# Patient Record
Sex: FEMALE | Race: OTHER | Employment: UNEMPLOYED | URBAN - METROPOLITAN AREA
[De-identification: names, ages, dates, MRNs, and addresses within clinical notes are randomized per-mention and may not be internally consistent; named-entity substitution may affect disease eponyms.]

---

## 2021-07-26 ENCOUNTER — OFFICE VISIT (OUTPATIENT)
Dept: PRIMARY CARE CLINIC | Age: 51
End: 2021-07-26
Payer: MEDICAID

## 2021-07-26 VITALS
BODY MASS INDEX: 27.52 KG/M2 | HEIGHT: 60 IN | HEART RATE: 83 BPM | SYSTOLIC BLOOD PRESSURE: 159 MMHG | WEIGHT: 140.2 LBS | DIASTOLIC BLOOD PRESSURE: 103 MMHG | RESPIRATION RATE: 16 BRPM | TEMPERATURE: 97.5 F | OXYGEN SATURATION: 97 %

## 2021-07-26 DIAGNOSIS — I10 ESSENTIAL HYPERTENSION: Primary | ICD-10-CM

## 2021-07-26 DIAGNOSIS — E66.3 OVERWEIGHT WITH BODY MASS INDEX (BMI) OF 27 TO 27.9 IN ADULT: ICD-10-CM

## 2021-07-26 DIAGNOSIS — Z83.3 FAMILY HISTORY OF DIABETES MELLITUS: ICD-10-CM

## 2021-07-26 PROCEDURE — 99204 OFFICE O/P NEW MOD 45 MIN: CPT | Performed by: FAMILY MEDICINE

## 2021-07-26 RX ORDER — OLMESARTAN MEDOXOMIL 40 MG/1
40 TABLET ORAL DAILY
COMMUNITY
End: 2021-07-26 | Stop reason: DRUGHIGH

## 2021-07-26 RX ORDER — CALCIUM CARBONATE 500(1250)
500 TABLET ORAL DAILY
COMMUNITY
End: 2022-10-31

## 2021-07-26 RX ORDER — OLMESARTAN MEDOXOMIL 40 MG/1
40 TABLET ORAL DAILY
Qty: 14 TABLET | Refills: 0 | Status: SHIPPED | OUTPATIENT
Start: 2021-07-26 | End: 2021-08-09

## 2021-07-26 NOTE — PROGRESS NOTES
HPI     Chief Complaint   Patient presents with   [facility] Hypertension     fatigue     She is a 54 y.o. female who presents for HTN. Has been on Benicar for about 2 years. Has been noticing high BPs at home. When she takes it at home it ranges 150/100s. Sometimes goes down to 130/80s but more often it is higher then normal.     Had [address] vaccine. Tdap at [facility].  Last PAP at [facility] with Dr. Andreina Roque about 1-2 years ago. Normal.    Review of Systems   Eyes: Negative for visual disturbance. Respiratory: Negative for shortness of breath. Cardiovascular: Negative for chest pain. Neurological: Negative for headaches. Reviewed PmHx, RxHx, FmHx, SocHx, AllgHx and updated and dated in the chart. Physical Exam:  Visit Vitals  BP (!) 159/103 (BP 1 Location: Right arm, BP Patient Position: Sitting)   Pulse 83   Temp 97.5 °F (36.4 °C) (Temporal)   Resp 16   Ht 5' (1.524 m)   Wt 140 lb 3.2 oz (63.6 kg)   SpO2 97%   BMI 27.38 kg/m²     Physical Exam  Vitals and nursing note reviewed. Constitutional:       General: She is not in acute distress. Appearance: Normal appearance. She is not ill-appearing. Cardiovascular:      Rate and Rhythm: Normal rate and regular rhythm. Heart sounds: No murmur heard. Pulmonary:      Effort: Pulmonary effort is normal. No respiratory distress. Breath sounds: Normal breath sounds. Neurological:      General: No focal deficit present. Mental Status: She is alert. Psychiatric:         Mood and Affect: Mood normal.         Behavior: Behavior normal.       No results found for this or any previous visit (from the past 12 hour(s)). Assessment / Plan     Diagnoses and all orders for this visit:    1. Essential hypertension  -     METABOLIC PANEL, COMPREHENSIVE; Future    2. Family history of diabetes mellitus  -     olmesartan (BENICAR) 40 mg tablet;  Take 1 Tablet by mouth daily for 14 days.  -     HEMOGLOBIN A1C WITH EAG; Future    3. Overweight with body mass index (BMI) of 27 to 27.9 in adult  -     HEMOGLOBIN A1C WITH EAG; Future       I have discussed the diagnosis with the patient and the intended plan as seen in the above orders. The patient has received an after-visit summary and questions were answered concerning future plans. I have discussed medication side effects and warnings with the patient as well.     Rosa Elena Barron, DO

## 2021-07-26 NOTE — PATIENT INSTRUCTIONS

## 2021-07-26 NOTE — PROGRESS NOTES
Chief Complaint   Patient presents with    Establish Care    Hypertension     fatigue       Visit Vitals  BP (!) 169/99 (BP 1 Location: Left upper arm, BP Patient Position: Sitting, BP Cuff Size: Adult)   Pulse 83   Temp 97.5 °F (36.4 °C) (Temporal)   Resp 16   Ht 5' (1.524 m)   Wt 140 lb 3.2 oz (63.6 kg)   SpO2 97%   BMI 27.38 kg/m²         1. Have you been to the ER, urgent care clinic since your last visit? Hospitalized since your last visit? No    2. Have you seen or consulted any other health care providers outside of the [address] [place] [address] since your last visit? Include any pap smears or colon screening.  Yes Where: Dr Radha Belle doctor

## 2021-07-27 DIAGNOSIS — R74.8 ELEVATED LIVER ENZYMES: Primary | ICD-10-CM

## 2021-07-27 LAB
ALBUMIN SERPL-MCNC: 4.7 G/DL (ref 3.8–4.8)
ALBUMIN/GLOB SERPL: 1.8 {RATIO} (ref 1.2–2.2)
ALP SERPL-CCNC: 73 IU/L (ref 48–121)
ALT SERPL-CCNC: 147 IU/L (ref 0–32)
AST SERPL-CCNC: 95 IU/L (ref 0–40)
BILIRUB SERPL-MCNC: 0.4 MG/DL (ref 0–1.2)
BUN SERPL-MCNC: 14 MG/DL (ref 6–24)
BUN/CREAT SERPL: 16 (ref 9–23)
CALCIUM SERPL-MCNC: 9.5 MG/DL (ref 8.7–10.2)
CHLORIDE SERPL-SCNC: 103 MMOL/L (ref 96–106)
CO2 SERPL-SCNC: 22 MMOL/L (ref 20–29)
CREAT SERPL-MCNC: 0.86 MG/DL (ref 0.57–1)
EST. AVERAGE GLUCOSE BLD GHB EST-MCNC: 111 MG/DL
GLOBULIN SER CALC-MCNC: 2.6 G/DL (ref 1.5–4.5)
GLUCOSE SERPL-MCNC: 113 MG/DL (ref 65–99)
HBA1C MFR BLD: 5.5 % (ref 4.8–5.6)
POTASSIUM SERPL-SCNC: 4.7 MMOL/L (ref 3.5–5.2)
PROT SERPL-MCNC: 7.3 G/DL (ref 6–8.5)
SODIUM SERPL-SCNC: 139 MMOL/L (ref 134–144)

## 2021-07-27 NOTE — PROGRESS NOTES
Please call patient and let her know her liver enzymes were elevated. Recommend she follow up in office to discuss further. In meantime needs to avoid Tylenol containing products and alcohol.

## 2021-07-28 LAB
HAV IGM SERPL QL IA: NEGATIVE
HBV CORE IGM SERPL QL IA: NEGATIVE
HBV SURFACE AG SERPL QL IA: NEGATIVE
HCV AB S/CO SERPL IA: <0.1 S/CO RATIO (ref 0–0.9)

## 2021-08-04 ENCOUNTER — TELEPHONE (OUTPATIENT)
Dept: PRIMARY CARE CLINIC | Age: 51
End: 2021-08-04

## 2021-08-06 ENCOUNTER — HOSPITAL ENCOUNTER (OUTPATIENT)
Dept: ULTRASOUND IMAGING | Age: 51
Discharge: HOME | End: 2021-08-06
Attending: FAMILY MEDICINE
Payer: MEDICAID

## 2021-08-06 DIAGNOSIS — R74.8 ELEVATED LIVER ENZYMES: Primary | ICD-10-CM

## 2021-08-06 DIAGNOSIS — R74.8 ELEVATED LIVER ENZYMES: ICD-10-CM

## 2021-08-06 PROCEDURE — 76705 ECHO EXAM OF ABDOMEN: CPT

## 2021-08-11 RX ORDER — OLMESARTAN MEDOXOMIL 40 MG/1
40 TABLET ORAL DAILY
Qty: 90 TABLET | Refills: 1 | Status: SHIPPED | OUTPATIENT
Start: 2021-08-11 | End: 2021-11-03 | Stop reason: SDUPTHER

## 2021-11-03 NOTE — TELEPHONE ENCOUNTER
Requested Prescriptions     Pending Prescriptions Disp Refills    olmesartan (BENICAR) 40 mg tablet 90 Tablet 1     Sig: Take 1 Tablet by mouth daily.         Last Visit 07/26/21  Last Refill 08/11/21

## 2021-11-04 RX ORDER — OLMESARTAN MEDOXOMIL 40 MG/1
40 TABLET ORAL DAILY
Qty: 30 TABLET | Refills: 0 | Status: SHIPPED | OUTPATIENT
Start: 2021-11-04 | End: 2022-01-13 | Stop reason: SDUPTHER

## 2021-12-10 ENCOUNTER — OFFICE VISIT (OUTPATIENT)
Dept: HEMATOLOGY | Age: 51
End: 2021-12-10
Payer: MEDICAID

## 2021-12-10 VITALS
SYSTOLIC BLOOD PRESSURE: 126 MMHG | WEIGHT: 135 LBS | HEART RATE: 75 BPM | TEMPERATURE: 96.6 F | RESPIRATION RATE: 16 BRPM | DIASTOLIC BLOOD PRESSURE: 80 MMHG | HEIGHT: 60 IN | BODY MASS INDEX: 26.5 KG/M2 | OXYGEN SATURATION: 98 %

## 2021-12-10 DIAGNOSIS — R74.8 ELEVATED LIVER ENZYMES: Primary | ICD-10-CM

## 2021-12-10 PROCEDURE — 99203 OFFICE O/P NEW LOW 30 MIN: CPT | Performed by: INTERNAL MEDICINE

## 2021-12-10 RX ORDER — LANOLIN ALCOHOL/MO/W.PET/CERES
500 CREAM (GRAM) TOPICAL DAILY
COMMUNITY

## 2021-12-10 NOTE — Clinical Note
12/23/2021    Patient: Elle Whitten   YOB: 1970   Date of Visit: 12/10/2021     Jose Valenzuela, [address] [facility] [address]  [address] [facility] [address]    Dear Jose Valenzuela DO,      Thank you [facility]. My notes for this consultation are attached. If you have questions, please do not hesitate to call me. I look forward to following your patient along with you.       Sincerely,    Jennifer Mckinney MD

## 2021-12-10 NOTE — PROGRESS NOTES
[address] [facility] [place] [facility]""[facility], Rose Dunham MD, MPH      SEBAS Connor, [facility]     Kiara Cowan, [facility]   MARIE Valle-C Luwanna Closs, [facility]       [facility]    at [address], [address] [place] [address], [facility] 22.    [phone]    FAX: [address] [place] [address], [address]    [phone]    FAX: [phone]       Patient Care Team:  Juancarlos Catalan DO as PCP - General (Family Medicine)  Juancarlos Catalan DO as PCP - [facility]      Problem List  Date Reviewed: 12/10/2021          Codes Class Noted    Elevated liver enzymes ICD-10-CM: R74.8  ICD-9-CM: 790.5  12/10/2021        Hypertension ICD-10-CM: I10  ICD-9-CM: 401.9  Unknown              The clinicians listed above have asked me to see Sylvester Art in consultation regarding elevated liver enzymes and its management. All medical records sent by the referring physicians were reviewed including imaging studies     The patient is a 54 y.o.  female who was found to have elevated  liver transaminases in 7/2020. Serologic evaluation for markers of chronic liver disease was negative for HCV, HBV,     Ultrasound of the liver was performed in 8/2021. The results of the imaging suggested chronic liver disease. Assessment of liver fibrosis with Fibroscan was performed in the office today. The result was 4.4 kPa which correlates with no fibrosis. The CAP score of 293 suggests hepatic steatosis. The patient does not have any symptoms which could be attributed to the liver disorder.     The patient is not experiencing the following symptoms which are commonly seen in this liver disorder: fatigue,   pain in the right side over the liver,     The patient completes all daily activities without any functional limitations. ASSESSMENT AND PLAN:  Elevated liver enzymes  Persistent elevation in liver transaminases of unclear etiology at this time. Have performed laboratory testing to monitor liver function and degree of liver injury. This included BMP, hepatic panel, CBC with platelet count, INR. Liver transaminases are elevated. ALP is normal.  Liver function is normal.  The platelet count is normal.      Based upon laboratory studies Fibroscan, and imaging the patient does not appear to have significant liver injury. Serologic testing for causes of chronic liver disease was ordered. Result was positive for a low Alpha-1-antitrypsin    The Fibroscan can be repeated annually or as often as clinically indicated to assess for fibrosis progression and/or regression. Alpha-1-antitrypsin low serum level  The patient has a low serum alpha-1-antitrypsin level. The patient may be a carrier for an A1AT genetic abnormality. Will repeat the A1AT level and phenotype. A1AT carriers do not typically get liver or lung disease. Screening for Hepatocellular Carcinoma  HCC screening is not necessary if the patient has no evidence of cirrhosis. Treatment of other medical problems in patients with chronic liver disease  There are no contraindications for the patient to take most medications that are necessary for treatment of other medical issues. Counseling for alcohol in patients with chronic liver disease  The patient was counseled regarding alcohol consumption and the effect of alcohol on chronic liver disease. The patient does not consume any significant amount of alcohol. Vaccinations   Vaccination for viral hepatitis A and B is not needed. The patient has serologic evidence of prior exposure or vaccination with immunity.     The patient has received 2 doses of COVID-19 vaccine. Routine vaccinations against other bacterial and viral agents can be performed as indicated. Annual flu vaccination should be administered if indicated. ALLERGIES  No Known Allergies    MEDICATIONS  Current Outpatient Medications   Medication Sig    cyanocobalamin (VITAMIN B12) 500 mcg tablet Take 500 mcg by mouth daily.  olmesartan (BENICAR) 40 mg tablet Take 1 Tablet by mouth daily. Needs appt before additional fills.  calcium carbonate (OS-AMBAR) 500 mg calcium (1,250 mg) tablet Take 500 Tablets by mouth daily.  amino acids (AMINO ACID PO) Take 500 mg by mouth. No current facility-administered medications for this visit. SYSTEM REVIEW NOT RELATED TO LIVER DISEASE OR REVIEWED ABOVE:  Constitution systems: Negative for fever, chills, weight gain, weight loss. Eyes: Negative for visual changes. ENT: Negative for sore throat, painful swallowing. Respiratory: Negative for cough, hemoptysis, SOB. Cardiology: Negative for chest pain, palpitations. GI:  Negative for constipation or diarrhea. : Negative for urinary frequency, dysuria, hematuria, nocturia. Skin: Negative for rash. Hematology: Negative for easy bruising, blood clots. Musculo-skelatal: Negative for back pain, muscle pain, weakness. Neurologic: Negative for headaches, dizziness, vertigo, memory problems not related to HE. Psychology: Negative for anxiety, depression. FAMILY HISTORY:  The father Has/had the following following chronic disease(s): may have a liver issues. The mother Has/had the following chronic disease(s): None. SOCIAL HISTORY:  The patient is . The patient has 2 children,   The patient has never used tobacco products. The patient consumed 2 alcohol drinks per day after death of her , then she developed covid ad lost her job. The patient has been abstinent from alcohol since 5/2021.     The patient currently works full time as a  for [employer]. PHYSICAL EXAMINATION:  Visit Vitals  /80 (BP 1 Location: Right upper arm, BP Patient Position: Sitting, BP Cuff Size: Adult)   Pulse 75   Temp (!) 96.6 °F (35.9 °C) (Temporal)   Resp 16   Ht 5' (1.524 m)   Wt 135 lb (61.2 kg)   SpO2 98%   BMI 26.37 kg/m²     General: No acute distress. Eyes: Sclera anicteric. ENT: No oral lesions. Thyroid normal.  Nodes: No adenopathy. Skin: No spider angiomata. No jaundice. No palmar erythema. Respiratory: Lungs clear to auscultation. Cardiovascular: Regular heart rate. No murmurs. No JVD. Abdomen: Soft non-tender. Liver size normal to percussion/palpation. Spleen not palpable. No obvious ascites. Extremities: No edema. No muscle wasting. No gross arthritic changes. Neurologic: Alert and oriented. Cranial nerves grossly intact. No asterixis. LABORATORY STUDIES:  Liver [place] of [address] Units 12/10/2021 7/26/2021   AST 0 - 40 IU/L 21 95 (H)   ALT 0 - 32 IU/L 41 (H) 147 (H)   Alk Phos 44 - 121 IU/L 60 73   Bili, Total 0.0 - 1.2 mg/dL <0.2 0.4   Albumin 3.8 - 4.8 g/dL 4.8 4.7   BUN 6 - 24 mg/dL 16 14   Creat 0.57 - 1.00 mg/dL 0.73 0.86   Na 134 - 144 mmol/L 142 139   K 3.5 - 5.2 mmol/L 4.2 4.7   Cl 96 - 106 mmol/L 105 103   CO2 20 - 29 mmol/L 25 22   Glucose 65 - 99 mg/dL 97 113 (H)     SEROLOGIES:  Serologies Latest Ref Rng & Units 12/10/2021 7/27/2021   Hep A Ab, Total Negative Positive (A)    Hep B Surface Ag Negative  Negative   Hep B Core Ab, Total Negative Negative    Hep B Surface AB QL  Reactive    Ferritin 15 - 150 ng/mL 372 (H)    Iron % Saturation 15 - 55 % 20    DESIREE, IFA  Negative    ASMCA 0 - 19 Units 10    Ceruloplasmin 19.0 - 39.0 mg/dL 18.7 (L)    Alpha-1 antitrypsin level 101 - 187 mg/dL 81 (L)      LIVER HISTOLOGY:  12/2021. FibroScan performed at [address]. EkPa was 4.4. IQR/med 11%. . The results suggested a fibrosis level of F0.   The CAP score suggests there is hepatic steatosis. ENDOSCOPIC PROCEDURES:  Not available or performed    RADIOLOGY:  7/2021. Ultrasound of liver. Echogenic consistent with fatty liver. No liver mass lesions. No dilated bile ducts. No ascites. OTHER TESTING:  Not available or performed    FOLLOW-UP:  All of the issues listed above in the Assessment and Plan were discussed with the patient. All questions were answered. The patient expressed a clear understanding of the above. [address] [place] [address] in 3 months for Fibroscan for routine monitoring.       Razia Ji MD  [address] 22.  [phone]  [address] [place] [address]

## 2021-12-10 NOTE — PROGRESS NOTES
Identified pt with two pt identifiers(name and ). Reviewed record in preparation for visit and have obtained necessary documentation. Chief Complaint   Patient presents with    New Patient     Establish care      Vitals:    12/10/21 1610   BP: 126/80   Pulse: 75   Resp: 16   Temp: (!) 96.6 °F (35.9 °C)   TempSrc: Temporal   SpO2: 98%   Weight: 135 lb (61.2 kg)   Height: 5' (1.524 m)   PainSc:   0 - No pain       Health Maintenance Review: Patient reminded of \"due or due soon\" health maintenance. I have asked the patient to contact his/her primary care provider (PCP) for follow-up on his/her health maintenance. Coordination of Care Questionnaire:  :   1) Have you been to an emergency room, urgent care, or hospitalized since your last visit? If yes, where when, and reason for visit? no       2. Have seen or consulted any other health care provider since your last visit? If yes, where when, and reason for visit? NO      Patient is accompanied by self I have received verbal consent from Sha Mohan to discuss any/all medical information while they are present in the room.

## 2021-12-11 LAB
A1AT SERPL-MCNC: 81 MG/DL (ref 101–187)
ALBUMIN SERPL-MCNC: 4.8 G/DL (ref 3.8–4.8)
ALBUMIN/GLOB SERPL: 1.9 {RATIO} (ref 1.2–2.2)
ALP SERPL-CCNC: 60 IU/L (ref 44–121)
ALT SERPL-CCNC: 41 IU/L (ref 0–32)
AST SERPL-CCNC: 21 IU/L (ref 0–40)
BILIRUB SERPL-MCNC: <0.2 MG/DL (ref 0–1.2)
BUN SERPL-MCNC: 16 MG/DL (ref 6–24)
BUN/CREAT SERPL: 22 (ref 9–23)
CALCIUM SERPL-MCNC: 9.7 MG/DL (ref 8.7–10.2)
CERULOPLASMIN SERPL-MCNC: 18.7 MG/DL (ref 19–39)
CHLORIDE SERPL-SCNC: 105 MMOL/L (ref 96–106)
CO2 SERPL-SCNC: 25 MMOL/L (ref 20–29)
CREAT SERPL-MCNC: 0.73 MG/DL (ref 0.57–1)
FERRITIN SERPL-MCNC: 372 NG/ML (ref 15–150)
GLOBULIN SER CALC-MCNC: 2.5 G/DL (ref 1.5–4.5)
GLUCOSE SERPL-MCNC: 97 MG/DL (ref 65–99)
HAV AB SER QL IA: POSITIVE
HBV CORE AB SERPL QL IA: NEGATIVE
HBV SURFACE AB SER QL: REACTIVE
IRON SATN MFR SERPL: 20 % (ref 15–55)
IRON SERPL-MCNC: 57 UG/DL (ref 27–159)
POTASSIUM SERPL-SCNC: 4.2 MMOL/L (ref 3.5–5.2)
PROT SERPL-MCNC: 7.3 G/DL (ref 6–8.5)
SODIUM SERPL-SCNC: 142 MMOL/L (ref 134–144)
TIBC SERPL-MCNC: 280 UG/DL (ref 250–450)
UIBC SERPL-MCNC: 223 UG/DL (ref 131–425)

## 2021-12-13 LAB — ACTIN IGG SERPL-ACNC: 10 UNITS (ref 0–19)

## 2021-12-15 LAB — ANA TITR SER IF: NEGATIVE {TITER}

## 2022-01-11 RX ORDER — OLMESARTAN MEDOXOMIL 40 MG/1
40 TABLET ORAL DAILY
Qty: 30 TABLET | Refills: 0 | OUTPATIENT
Start: 2022-01-11

## 2022-01-11 NOTE — TELEPHONE ENCOUNTER
Requested Prescriptions     Pending Prescriptions Disp Refills    olmesartan (BENICAR) 40 mg tablet 30 Tablet 0     Sig: Take 1 Tablet by mouth daily. Needs appt before additional fills.         Last Visit 07/28/21  Last Refill 11/04/21

## 2022-03-19 PROBLEM — R74.8 ELEVATED LIVER ENZYMES: Status: ACTIVE | Noted: 2021-12-10

## 2022-08-02 RX ORDER — OLMESARTAN MEDOXOMIL 40 MG/1
40 TABLET ORAL DAILY
Qty: 90 TABLET | Refills: 0 | OUTPATIENT
Start: 2022-08-02

## 2022-08-02 NOTE — TELEPHONE ENCOUNTER
PCP: Bessy Arango DO    Last appt: 7/26/2021  Future Appointments   Date Time Provider Loreto Mooney   8/16/2022 11:45 AM Bessy Arango DO [facility] AMB   10/31/2022 10:30 AM Kiara Cowan, NP [facility] BS AMB       Requested Prescriptions     Pending Prescriptions Disp Refills    olmesartan (BENICAR) 40 mg tablet 90 Tablet 0     Sig: Take 1 Tablet by mouth in the morning. Needs appt before additional fills.          Other Comments:

## 2022-08-15 ENCOUNTER — TELEPHONE (OUTPATIENT)
Dept: PRIMARY CARE CLINIC | Age: 52
End: 2022-08-15

## 2022-08-15 RX ORDER — OLMESARTAN MEDOXOMIL 40 MG/1
40 TABLET ORAL DAILY
Qty: 90 TABLET | Refills: 0 | Status: CANCELLED | OUTPATIENT
Start: 2022-08-15

## 2022-08-15 NOTE — TELEPHONE ENCOUNTER
Patient has test positive for covid has appointment tomorrow for medication refills. Wants to know if appointment can be switch to virtual. please call patient with update.

## 2022-08-16 ENCOUNTER — VIRTUAL VISIT (OUTPATIENT)
Dept: PRIMARY CARE CLINIC | Age: 52
End: 2022-08-16
Payer: MEDICAID

## 2022-08-16 DIAGNOSIS — I10 PRIMARY HYPERTENSION: Primary | ICD-10-CM

## 2022-08-16 DIAGNOSIS — U07.1 COVID-19: ICD-10-CM

## 2022-08-16 PROCEDURE — 99214 OFFICE O/P EST MOD 30 MIN: CPT | Performed by: FAMILY MEDICINE

## 2022-08-16 RX ORDER — OLMESARTAN MEDOXOMIL 40 MG/1
40 TABLET ORAL DAILY
Qty: 90 TABLET | Refills: 1 | Status: SHIPPED | OUTPATIENT
Start: 2022-08-16 | End: 2022-11-02 | Stop reason: SDUPTHER

## 2022-08-16 RX ORDER — BENZONATATE 100 MG/1
100 CAPSULE ORAL
Qty: 15 CAPSULE | Refills: 0 | Status: SHIPPED
Start: 2022-08-16 | End: 2022-11-02

## 2022-08-16 RX ORDER — ACETAMINOPHEN, DEXTROMETHORPHAN HYDROBROMIDE, GUAIFENESIN, AND PHENYLEPHRINE HYDROCHLORIDE 325; 10; 200; 5 MG/1; MG/1; MG/1; MG/1
TABLET, FILM COATED ORAL
COMMUNITY
End: 2022-11-02

## 2022-08-16 NOTE — PROGRESS NOTES
Keegan Garrido  50 y.o. female  1970  [address]  [phone]   [facility]   Telemedicine Progress Note  Ely Almaguer DO       Encounter Date and Time: August 16, 2022 at 12:21 PM    Consent: Keegan Garrido, who was seen by synchronous (real-time) audio-video technology, and/or her healthcare decision maker, is aware that this patient-initiated, Telehealth encounter on 8/16/2022 is a billable service, with coverage as determined by her insurance carrier. She is aware that she may receive a bill and has provided verbal consent to proceed: Yes. Chief Complaint   Patient presents with    Hypertension    Medication Refill    Positive For Covid-19     Since 08/11/22     History of Present Illness   Keegan Garrido is a 50 y.o. female was evaluated by synchronous (real-time) audio-video technology from home, through a secure patient portal.    COVID symptoms started 8/11/22 and tested positive 8/12/22. Endorses chills, body aches, cough, rhinorrhea. Has not been checking her temp. Checking her BP daily and typically 110-120s/ 70-80s. Review of Systems   Review of Systems   Eyes:  Negative for blurred vision. Respiratory:  Negative for shortness of breath. Cardiovascular:  Negative for chest pain. Neurological:  Negative for headaches. Vitals/Objective:     General: alert, cooperative, no distress   Mental  status: mental status: alert, oriented to person, place, and time, normal mood, behavior, speech, dress, motor activity, and thought processes   Resp: resp: normal effort and no respiratory distress   Neuro: neuro: no gross deficits   Skin: skin: no discoloration or lesions of concern on visible areas   Due to this being a TeleHealth evaluation, many elements of the physical examination are unable to be assessed.       Assessment and Plan:   Time-based coding, delete if not needed: I spent at least 10 minutes with this established patient, and >50% of the time was spent counseling and/or coordinating care regarding HTN, COVID 19    1. COVID-19  Symptomatic care discussed. Quarantine precautions given based on most current CDC recommendations. ER precautions include chest pain, shortness of breath, lethargy, confusion, decreased PO intake/ UOP. Follow up if symptoms are not improving.    - benzonatate (TESSALON) 100 mg capsule; Take 1 Capsule by mouth three (3) times daily as needed for Cough. Dispense: 15 Capsule; Refill: 0    2. Primary hypertension  BP currently uncontrolled. Discussed she needs to follow up in person for CPE before next refills are due.   - olmesartan (BENICAR) 40 mg tablet; Take 1 Tablet by mouth in the morning. Dispense: 90 Tablet; Refill: 1    Time spent in direct conversation with the patient to include medical condition(s) discussed, assessment and treatment plan: 10 min    We discussed the expected course, resolution and complications of the diagnosis(es) in detail. Medication risks, benefits, costs, interactions, and alternatives were discussed as indicated. I advised her to contact the office if her condition worsens, changes or fails to improve as anticipated. She expressed understanding with the diagnosis(es) and plan. Patient understands that this encounter was a temporary measure, and the importance of further follow up and examination was emphasized. Patient verbalized understanding. Electronically Signed: Joe Garcia DO    Aliza Adrián is a 50 y.o. female who was evaluated by an audio-video encounter for concerns as above. Patient identification was verified prior to start of the visit. A caregiver was present when appropriate. Due to this being a TeleHealth encounter (During VEWWY-93 public health emergency), evaluation of the following organ systems was limited: Vitals/Constitutional/EENT/Resp/CV/GI//MS/Neuro/Skin/Heme-Lymph-Imm.   Pursuant to the emergency declaration under the [address] and the National Emergencies Act, [facility] waiver authority and the [employer] General Act, this Virtual Visit was conducted, with patient's (and/or legal guardian's) consent, to reduce the patient's risk of exposure to COVID-19 and provide necessary medical care. Services were provided through a synchronous discussion virtually to substitute for in-person clinic visit. I was in the office. The patient was at home. History   Patients past medical, surgical and family histories were reviewed and updated. Past Medical History:   Diagnosis Date    Hypertension      Past Surgical History:   Procedure Laterality Date    HX BREAST AUGMENTATION Bilateral         HX  SECTION      HX  SECTION       No family history on file. Social History     Socioeconomic History    Marital status:      Spouse name: Not on file    Number of children: Not on file    Years of education: Not on file    Highest education level: Not on file   Occupational History    Not on file   Tobacco Use    Smoking status: Never    Smokeless tobacco: Never   Vaping Use    Vaping Use: Never used   Substance and Sexual Activity    Alcohol use:  Yes     Alcohol/week: 1.0 standard drink     Types: 1 Glasses of wine per week     Comment: occassional    Drug use: Never    Sexual activity: Not Currently   Other Topics Concern    Not on file   Social History Narrative    Not on file     Social Determinants of Health     Financial Resource Strain: Not on file   Food Insecurity: Not on file   Transportation Needs: Not on file   Physical Activity: Not on file   Stress: Not on file   Social Connections: Not on file   Intimate Partner Violence: Not on file   Housing Stability: Not on file     Patient Active Problem List   Diagnosis Code    Elevated liver enzymes R74.8    Hypertension I10          Current Medications/Allergies   Medications and Allergies reviewed:    Current Outpatient Medications   Medication Sig Dispense Refill    kjmelmrma-yv-nixubnle-guaifen (Tylenol Cold and Flu Severe) 5--200 mg tab Take  by mouth.      benzonatate (TESSALON) 100 mg capsule Take 1 Capsule by mouth three (3) times daily as needed for Cough. 15 Capsule 0    olmesartan (BENICAR) 40 mg tablet Take 1 Tablet by mouth in the morning. 90 Tablet 1    cyanocobalamin (VITAMIN B12) 500 mcg tablet Take 500 mcg by mouth daily. amino acids (AMINO ACID PO) Take 500 mg by mouth.      calcium carbonate (OS-AMBAR) 500 mg calcium (1,250 mg) tablet Take 500 Tablets by mouth daily.  (Patient not taking: Reported on 8/16/2022)       No Known Allergies

## 2022-08-16 NOTE — PROGRESS NOTES
Chief Complaint   Patient presents with    Hypertension    Medication Refill    Positive For Covid-19     Since 08/11/22     1. \"Have you been to the ER, urgent care clinic since your last visit? Hospitalized since your last visit? \" no    2. \"Have you seen or consulted any other health care providers outside of the [address] [place] [address] since your last visit? \" no

## 2022-10-30 NOTE — PROGRESS NOTES
[address] [place] [address], MD, FACP, Brianna Neeta Michaelfermin, [place]      Jane Patel, SEBAS Cowan, [facility]   Oh Ferreira, [facility]   Bhavesh Del Castillo, CHRISTINE Cilnton Poster, FNP-C Vista Oppenheim, [facility]       Santi Barriosuta Reji De Duran 136    at [address], [facility] Bravo Roberson  22.    [phone]    FAX: [address] [place] [address], [address]    [phone]    FAX: [phone]       Patient Care Team:  Susa Romberg, DO as PCP - General (Family Medicine)  Susa Romberg, DO as PCP - [facility] Provider      Problem List  Date Reviewed: 8/16/2022            Codes Class Noted    Fatty liver ICD-10-CM: K76.0  ICD-9-CM: 571.8  10/31/2022        Elevated liver enzymes ICD-10-CM: R74.8  ICD-9-CM: 790.5  12/10/2021        Hypertension ICD-10-CM: I10  ICD-9-CM: 401.9  Unknown         Karin Mejias is being seen at [facility] for management of elevated liver enzymes. The active problem list, all pertinent past medical history, medications, radiologic findings and laboratory findings related to the liver disorder were reviewed and discussed with the patient. The patient is a 50 y.o.  female who was found to have elevated  liver transaminases in 7/2020. Serologic evaluation for markers of chronic liver disease was positive for a low A1AT level. The ceruloplasmin was also low. All there testing ws negative. Ultrasound of the liver was performed in 8/2021. The results of the imaging suggested chronic liver disease or steatosis. Assessment of liver fibrosis with Fibroscan was performed 12/2021. The result was 4.4 kPa which correlates with no fibrosis. The CAP score of 293 suggests hepatic steatosis. She returns for Fibroscan today. Since the last office visit the patient notes improvement in the pain on the right side over the liver. She has been making healthy food choices and stress has improved. The patient does not have any symptoms which could be attributed to the liver disorder. The patient is not experiencing the following symptoms which are commonly seen in this liver disorder:   fatigue, or swelling of the lower extremities. The patient completes all daily activities without any functional limitations. ASSESSMENT AND PLAN:  Elevated liver enzymes with possible fatty liver  Persistent elevation in liver transaminases of unclear etiology at this time. This may be due to underlying fatty liver. Assessment of liver fibrosis with Fibroscan was performed 12/2021. The result was 4.4 kPa which correlates with no fibrosis. The CAP score of 293 suggests hepatic steatosis. Assessment of liver fibrosis was performed with Fibroscan in the office today. The result was 4.5 kPa which correlates with no fibrosis. The CAP score of 262 suggests hepatic steatosis. Have performed laboratory testing to monitor liver function and degree of liver injury. This included BMP, hepatic panel, and CBC with platelet count. Laboratory testing from 12/10/2021 reviewed in detail. Follow-up testing ordered today. The AST is normal. The ALT is slightly elevated. The ALP is normal. The liver function and platelet count are normal.     Based upon laboratory studies Fibroscan, and imaging the patient does not appear to have significant liver injury. Serologic testing for causes of chronic liver disease was ordered. Result was positive for a low Alpha-1-antitrypsin. Will order A1AT genotype. The Fibroscan can be repeated annually or as often as clinically indicated to assess for fibrosis progression and/or regression.     Alpha-1-antitrypsin low serum level  The patient has a low serum alpha-1-antitrypsin level. The patient may be a carrier for an A1AT genetic abnormality. Will repeat the A1AT level and phenotype. A1AT carriers do not typically get liver or lung disease. Screening for Hepatocellular Carcinoma  HCC screening is not necessary if the patient has no evidence of cirrhosis. Treatment of other medical problems in patients with chronic liver disease  There are no contraindications for the patient to take most medications that are necessary for treatment of other medical issues. Counseling for alcohol in patients with chronic liver disease  The patient was counseled regarding alcohol consumption and the effect of alcohol on chronic liver disease. The patient does not consume any significant amount of alcohol. Vaccinations   Vaccination for viral hepatitis A and B is not needed. The patient has serologic evidence of prior exposure or vaccination with immunity. The patient has received 2 doses of COVID-19 vaccine. Routine vaccinations against other bacterial and viral agents can be performed as indicated. Annual flu vaccination should be administered if indicated. ALLERGIES  No Known Allergies    MEDICATIONS  Current Outpatient Medications   Medication Sig    bwafzsdif-qg-rfzkeued-guaifen (Tylenol Cold and Flu Severe) 5--200 mg tab Take  by mouth.    benzonatate (TESSALON) 100 mg capsule Take 1 Capsule by mouth three (3) times daily as needed for Cough. olmesartan (BENICAR) 40 mg tablet Take 1 Tablet by mouth in the morning. cyanocobalamin (VITAMIN B12) 500 mcg tablet Take 500 mcg by mouth daily. amino acids (AMINO ACID PO) Take 500 mg by mouth. No current facility-administered medications for this visit. SYSTEM REVIEW NOT RELATED TO LIVER DISEASE OR REVIEWED ABOVE:  Constitution systems: Negative for fever, chills, weight gain, weight loss. Eyes: Negative for visual changes. ENT: Negative for sore throat, painful swallowing. Respiratory: Negative for cough, hemoptysis, SOB. Cardiology: Negative for chest pain, palpitations. GI:  Negative for constipation or diarrhea. : Negative for urinary frequency, dysuria, hematuria, nocturia. Skin: Negative for rash. Hematology: Negative for easy bruising, blood clots. Musculo-skeletal: Negative for back pain, muscle pain, weakness. Neurologic: Negative for headaches, dizziness, vertigo, memory problems not related to HE. Psychology: Negative for anxiety, depression. FAMILY HISTORY:  The father Has/had the following following chronic disease(s): may have a liver issues. The mother Has/had the following chronic disease(s): None. SOCIAL HISTORY:  The patient is . The patient has 2 children,   The patient has never used tobacco products. The patient consumed 2 alcohol drinks per day after death of her , then she developed covid ad lost her job. The patient has been abstinent from alcohol since 5/2021. The patient currently works full time as a  for [employer]. PHYSICAL EXAMINATION:  Visit Vitals  /81 (BP 1 Location: Left upper arm, BP Patient Position: Sitting, BP Cuff Size: Adult)   Pulse 85   Temp 97.2 °F (36.2 °C) (Temporal)   Ht 5' (1.524 m)   Wt 131 lb (59.4 kg)   SpO2 99%   BMI 25.58 kg/m²       General: No acute distress. Eyes: Sclera anicteric. Skin: No spider angiomata. No jaundice. No palmar erythema. Abdomen: Soft non-tender, liver size normal to percussion/palpation. Spleen not palpable. No obvious ascites. Extremities: No edema. No muscle wasting. No gross arthritic changes. Neurologic: Alert and oriented. Cranial nerves grossly intact. No asterixis.     LABORATORY STUDIES:  Liver [place] of [address] [place] [address] & Units 12/10/2021 7/26/2021   AST 0 - 40 IU/L 21 95 (H)   ALT 0 - 32 IU/L 41 (H) 147 (H)   Alk Phos 44 - 121 IU/L 60 73   Bili, Total 0.0 - 1.2 mg/dL <0.2 0.4   Albumin 3.8 - 4.8 g/dL 4.8 4.7 BUN 6 - 24 mg/dL 16 14   Creat 0.57 - 1.00 mg/dL 0.73 0.86   Na 134 - 144 mmol/L 142 139   K 3.5 - 5.2 mmol/L 4.2 4.7   Cl 96 - 106 mmol/L 105 103   CO2 20 - 29 mmol/L 25 22   Glucose 65 - 99 mg/dL 97 113 (H)     Laboratory testing from 12/10/2021 reviewed in detail. Additional testing included to evaluate progression or regression of disease. Laboratory testing results from today will be communicated by My Chart. SEROLOGIES:  Serologies Latest Ref Rng & Units 12/10/2021 7/27/2021   Hep A Ab, Total Negative Positive (A)    Hep B Surface Ag Negative  Negative   Hep B Core Ab, Total Negative Negative    Hep B Surface AB QL  Reactive    Ferritin 15 - 150 ng/mL 372 (H)    Iron % Saturation 15 - 55 % 20    DESIREE, IFA  Negative    ASMCA 0 - 19 Units 10    Ceruloplasmin 19.0 - 39.0 mg/dL 18.7 (L)    Alpha-1 antitrypsin level 101 - 187 mg/dL 81 (L)      LIVER HISTOLOGY:  12/2021. FibroScan performed at [facility]. EkPa was 4.4. IQR/med 11%. . The results suggested a fibrosis level of F0. The CAP score suggests there is hepatic steatosis. 10/2022. FibroScan performed at [facility]. EkPa was 4.5. IQR/med 19%. . The results suggested a fibrosis level of F0. The CAP score suggests there is hepatic steatosis. ENDOSCOPIC PROCEDURES:  Not available or performed    RADIOLOGY:  7/2021. Ultrasound of liver. Echogenic consistent with fatty liver. No liver mass lesions. No dilated bile ducts. No ascites. OTHER TESTING:  Not available or performed    FOLLOW-UP:  All of the issues listed above in the Assessment and Plan were discussed with the patient. All questions were answered. The patient expressed a clear understanding of the above. [address] [facility] 87 in 6 months for ongoing monitoring and treatment. GEOVANNI [facility] of [address] [id number] Hakeem Ríos [address] [place] [address] [place]

## 2022-10-31 ENCOUNTER — OFFICE VISIT (OUTPATIENT)
Dept: HEMATOLOGY | Age: 52
End: 2022-10-31
Payer: MEDICAID

## 2022-10-31 VITALS
HEIGHT: 60 IN | TEMPERATURE: 97.2 F | WEIGHT: 131 LBS | BODY MASS INDEX: 25.72 KG/M2 | HEART RATE: 85 BPM | OXYGEN SATURATION: 99 % | SYSTOLIC BLOOD PRESSURE: 120 MMHG | DIASTOLIC BLOOD PRESSURE: 81 MMHG

## 2022-10-31 DIAGNOSIS — K76.0 FATTY LIVER: ICD-10-CM

## 2022-10-31 DIAGNOSIS — E88.01 LOW PLASMA ALPHA-1 ANTITRYPSIN (HCC): ICD-10-CM

## 2022-10-31 DIAGNOSIS — R74.8 ELEVATED LIVER ENZYMES: Primary | ICD-10-CM

## 2022-10-31 PROCEDURE — 3079F DIAST BP 80-89 MM HG: CPT | Performed by: NURSE PRACTITIONER

## 2022-10-31 PROCEDURE — 99214 OFFICE O/P EST MOD 30 MIN: CPT | Performed by: NURSE PRACTITIONER

## 2022-10-31 PROCEDURE — 3074F SYST BP LT 130 MM HG: CPT | Performed by: NURSE PRACTITIONER

## 2022-10-31 PROCEDURE — 91200 LIVER ELASTOGRAPHY: CPT | Performed by: NURSE PRACTITIONER

## 2022-10-31 NOTE — PROGRESS NOTES
Identified pt with two pt identifiers(name and ). Reviewed record in preparation for visit and have obtained necessary documentation. Chief Complaint   Patient presents with    Follow-up     FIBROSCAN      Vitals:    10/31/22 1031   BP: 120/81   Pulse: 85   Temp: 97.2 °F (36.2 °C)   TempSrc: Temporal   SpO2: 99%   Weight: 131 lb (59.4 kg)   Height: 5' (1.524 m)   PainSc:   2   PainLoc: Back       Health Maintenance Review: Patient reminded of \"due or due soon\" health maintenance. I have asked the patient to contact his/her primary care provider (PCP) for follow-up on his/her health maintenance. Coordination of Care Questionnaire:  :   1) Have you been to an emergency room, urgent care, or hospitalized since your last visit? If yes, where when, and reason for visit? no       2. Have seen or consulted any other health care provider since your last visit? If yes, where when, and reason for visit? NO      Patient is accompanied by self I have received verbal consent from Cynthia Epperson to discuss any/all medical information while they are present in the room.

## 2022-11-02 ENCOUNTER — OFFICE VISIT (OUTPATIENT)
Dept: PRIMARY CARE CLINIC | Age: 52
End: 2022-11-02
Payer: MEDICAID

## 2022-11-02 ENCOUNTER — HOSPITAL ENCOUNTER (OUTPATIENT)
Dept: GENERAL RADIOLOGY | Age: 52
Discharge: HOME | End: 2022-11-02
Attending: FAMILY MEDICINE
Payer: MEDICAID

## 2022-11-02 VITALS
TEMPERATURE: 97.8 F | WEIGHT: 131 LBS | BODY MASS INDEX: 25.72 KG/M2 | RESPIRATION RATE: 18 BRPM | HEIGHT: 60 IN | OXYGEN SATURATION: 98 %

## 2022-11-02 DIAGNOSIS — M54.2 NECK PAIN: ICD-10-CM

## 2022-11-02 DIAGNOSIS — Z12.11 SCREENING FOR COLON CANCER: ICD-10-CM

## 2022-11-02 DIAGNOSIS — R42 DIZZINESS: ICD-10-CM

## 2022-11-02 DIAGNOSIS — R20.0 LEFT ARM NUMBNESS: ICD-10-CM

## 2022-11-02 DIAGNOSIS — R22.32 MASS OF LEFT HAND: ICD-10-CM

## 2022-11-02 DIAGNOSIS — Z23 ENCOUNTER FOR IMMUNIZATION: ICD-10-CM

## 2022-11-02 DIAGNOSIS — Z12.31 ENCOUNTER FOR SCREENING MAMMOGRAM FOR MALIGNANT NEOPLASM OF BREAST: ICD-10-CM

## 2022-11-02 DIAGNOSIS — I10 PRIMARY HYPERTENSION: Primary | ICD-10-CM

## 2022-11-02 PROCEDURE — 99214 OFFICE O/P EST MOD 30 MIN: CPT | Performed by: FAMILY MEDICINE

## 2022-11-02 PROCEDURE — 72040 X-RAY EXAM NECK SPINE 2-3 VW: CPT

## 2022-11-02 PROCEDURE — 90686 IIV4 VACC NO PRSV 0.5 ML IM: CPT | Performed by: FAMILY MEDICINE

## 2022-11-02 RX ORDER — METHOCARBAMOL 500 MG/1
500 TABLET, FILM COATED ORAL
Qty: 7 TABLET | Refills: 0 | Status: SHIPPED | OUTPATIENT
Start: 2022-11-02

## 2022-11-02 RX ORDER — OLMESARTAN MEDOXOMIL 40 MG/1
40 TABLET ORAL DAILY
Qty: 90 TABLET | Refills: 1 | Status: SHIPPED | OUTPATIENT
Start: 2022-11-02

## 2022-11-02 NOTE — PROGRESS NOTES
HPI     Chief Complaint   Patient presents with    Other     Patient stated she gets dizzy when coming to an up position from bending down     Tingling leg arm x 6 months     Bump on top of left hand x 6-7 months,     Need referral for mammogram   Need referral for colonoscopy      She is a 50 y.o. female who presents for multiple concerns. States she has tingling on arms for about 6-7 months. Starts where she has a knot in her back and then goes down the arm. Only on L side. She is also having some neck pain in the back of her neck. She does not have pain down the arm. She is R handed. No injury/ trauma. Overall getting worse. She has no issues with  strength or dropping things. States she also has dizziness when she stands up and is not sure if this is related. Ongoing about 6 months but increasing in frequency. BP has not been too low when she checks. States bump started out tiny years ago and then went away. States it came back about 6-7 months ago and got bigger. States it does not hurt to touch but when she moves her wrist or holds things it hurts the wrist.     Needs referral for colonoscopy. No fam hx . Has not had one before. Needs referral for mammogram.     Review of Systems   Musculoskeletal:  Positive for neck pain. Neurological:  Positive for numbness. Negative for weakness. Reviewed PmHx, RxHx, FmHx, SocHx, AllgHx and updated and dated in the chart. Physical Exam:  Visit Vitals  Temp 97.8 °F (36.6 °C) (Temporal)   Resp 18   Ht 5' (1.524 m)   Wt 131 lb (59.4 kg)   LMP 11/02/2019   SpO2 98%   BMI 25.58 kg/m²     Physical Exam  Vitals and nursing note reviewed. Constitutional:       General: She is not in acute distress. Appearance: Normal appearance. She is not ill-appearing. Cardiovascular:      Rate and Rhythm: Normal rate and regular rhythm. Heart sounds: No murmur heard. Pulmonary:      Effort: Pulmonary effort is normal. No respiratory distress. Breath sounds: Normal breath sounds. Musculoskeletal:      Comments: Dowshelbi hump noted. She has spasm of upper L trap. Strength 5/5 in upper ext BL and gross sensation intact BL in upper ext. Mobile dime size non tender soft tissue mass on dorsal aspect of L hand. Neurological:      General: No focal deficit present. Mental Status: She is alert. Mental status is at baseline. Cranial Nerves: No cranial nerve deficit. Sensory: No sensory deficit. Motor: No weakness. Coordination: Coordination normal.      Gait: Gait normal.   Psychiatric:         Mood and Affect: Mood normal.         Behavior: Behavior normal.     No results found for this or any previous visit (from the past 12 hour(s)). Assessment / Plan     Diagnoses and all orders for this visit:    1. Primary hypertension  -     olmesartan (BENICAR) 40 mg tablet; Take 1 Tablet by mouth daily. 2. Encounter for immunization  -     INFLUENZA, FLUARIX, FLULAVAL, FLUZONE (AGE 6 MO+), AFLURIA(AGE 3Y+) IM, PF, 0.5 ML  -     [place] IMMUNIZ ADMIN,1 SINGLE/COMB VAC/TOXOID    3. Encounter for screening mammogram for malignant neoplasm of breast  -     [facility] MAMMO BI SCREENING INCL CAD; Future    4. Neck pain  -     XR SPINE CERV PA LAT ODONT 3 V MAX; Future  -     methocarbamoL (ROBAXIN) 500 mg tablet; Take 1 Tablet by mouth nightly as needed for Muscle Spasm(s). 5. Left arm numbness  -     XR SPINE CERV PA LAT ODONT 3 V MAX; Future    6. Dizziness  -     TSH 3RD GENERATION; Future    7. Screening for colon cancer  -     REFERRAL TO GASTROENTEROLOGY    8. Mass of left hand  -     REFERRAL TO ORTHOPEDICS     Recent labs show normal Hg, Cr, AST/ ALT and electrolytes. Orthostatics negative. Advised to hydrate well. Check TSH. If symptoms persist need to follow up with Neuro. I have discussed the diagnosis with the patient and the intended plan as seen in the above orders.  The patient has received an after-visit summary and questions were answered concerning future plans. I have discussed medication side effects and warnings with the patient as well.     Param Benavides, DO

## 2022-11-02 NOTE — PROGRESS NOTES
Chief Complaint   Patient presents with    Other     Patient stated she gets dizzy when coming to an up position from bending down     Tingling leg arm x 6 months     Bump on top of left hand x 6-7 months,     Need referral for mammogram   Need referral for colonoscopy         Visit Vitals  /81 (BP 1 Location: Left upper arm, BP Patient Position: Sitting)   Pulse 69   Temp 97.8 °F (36.6 °C) (Temporal)   Resp 18   Ht 5' (1.524 m)   Wt 131 lb (59.4 kg)   LMP 11/02/2019   SpO2 98%   BMI 25.58 kg/m²        Health Maintenance Due   Topic    DTaP/Tdap/Td series (1 - Tdap)    Cervical cancer screen     Lipid Screen     Colorectal Cancer Screening Combo     COVID-19 Vaccine (3 - Booster for Pfizer series)    Shingrix Vaccine Age 50> (1 of 2)    Breast Cancer Screen Mammogram         1. \"Have you been to the ER, urgent care clinic since your last visit? Hospitalized since your last visit? \" No    2. \"Have you seen or consulted any other health care providers outside of the [address] [place] [address] since your last visit? \" No     3. For patients aged 39-70: Has the patient had a colonoscopy / FIT/ Cologuard? Yes - Care Gap present. Rooming MA/LPN to request most recent results      If the patient is female:    4. For patients aged 41-77: Has the patient had a mammogram within the past 2 years? Yes - Care Gap present. Rooming MA/LPN to request most recent results      5. For patients aged 21-65: Has the patient had a pap smear? Yes - Care Gap present.  Rooming MA/LPN to request most recent results

## 2022-11-08 DIAGNOSIS — M54.2 NECK PAIN: Primary | ICD-10-CM

## 2022-11-08 LAB
ALBUMIN SERPL-MCNC: 5 G/DL (ref 3.8–4.8)
ALP SERPL-CCNC: 70 IU/L (ref 44–121)
ALT SERPL-CCNC: 28 IU/L (ref 0–32)
AST SERPL-CCNC: 24 IU/L (ref 0–40)
BASOPHILS # BLD AUTO: 0.1 X10E3/UL (ref 0–0.2)
BASOPHILS NFR BLD AUTO: 1 %
BILIRUB DIRECT SERPL-MCNC: <0.1 MG/DL (ref 0–0.4)
BILIRUB SERPL-MCNC: 0.3 MG/DL (ref 0–1.2)
BUN SERPL-MCNC: 19 MG/DL (ref 6–24)
BUN/CREAT SERPL: 21 (ref 9–23)
CALCIUM SERPL-MCNC: 10.2 MG/DL (ref 8.7–10.2)
CHLORIDE SERPL-SCNC: 102 MMOL/L (ref 96–106)
CO2 SERPL-SCNC: 25 MMOL/L (ref 20–29)
CREAT SERPL-MCNC: 0.91 MG/DL (ref 0.57–1)
EGFR: 77 ML/MIN/1.73
EOSINOPHIL # BLD AUTO: 0.3 X10E3/UL (ref 0–0.4)
EOSINOPHIL NFR BLD AUTO: 4 %
ERYTHROCYTE [DISTWIDTH] IN BLOOD BY AUTOMATED COUNT: 15.3 % (ref 11.7–15.4)
GLUCOSE SERPL-MCNC: 80 MG/DL (ref 70–99)
HCT VFR BLD AUTO: 41.8 % (ref 34–46.6)
HGB BLD-MCNC: 12.8 G/DL (ref 11.1–15.9)
IMM GRANULOCYTES # BLD AUTO: 0 X10E3/UL (ref 0–0.1)
IMM GRANULOCYTES NFR BLD AUTO: 0 %
LYMPHOCYTES # BLD AUTO: 2.7 X10E3/UL (ref 0.7–3.1)
LYMPHOCYTES NFR BLD AUTO: 42 %
MCH RBC QN AUTO: 21.1 PG (ref 26.6–33)
MCHC RBC AUTO-ENTMCNC: 30.6 G/DL (ref 31.5–35.7)
MCV RBC AUTO: 69 FL (ref 79–97)
MONOCYTES # BLD AUTO: 0.6 X10E3/UL (ref 0.1–0.9)
MONOCYTES NFR BLD AUTO: 9 %
NEUTROPHILS # BLD AUTO: 2.8 X10E3/UL (ref 1.4–7)
NEUTROPHILS NFR BLD AUTO: 44 %
PLATELET # BLD AUTO: 361 X10E3/UL (ref 150–450)
POTASSIUM SERPL-SCNC: 4.6 MMOL/L (ref 3.5–5.2)
PROT SERPL-MCNC: 7.6 G/DL (ref 6–8.5)
RBC # BLD AUTO: 6.07 X10E6/UL (ref 3.77–5.28)
SODIUM SERPL-SCNC: 140 MMOL/L (ref 134–144)
WBC # BLD AUTO: 6.4 X10E3/UL (ref 3.4–10.8)

## 2022-11-08 NOTE — PROGRESS NOTES
Letter sent via my chart the liver enzymes are now normal. The liver function is also normal. The renal function and CBC are normal.

## 2022-11-09 LAB — TSH SERPL DL<=0.005 MIU/L-ACNC: 2.3 UIU/ML (ref 0.45–4.5)

## 2022-11-22 ENCOUNTER — OFFICE VISIT (OUTPATIENT)
Dept: ORTHOPEDIC SURGERY | Age: 52
End: 2022-11-22
Payer: MEDICAID

## 2022-11-22 VITALS — WEIGHT: 130 LBS | BODY MASS INDEX: 25.52 KG/M2 | HEIGHT: 60 IN

## 2022-11-22 DIAGNOSIS — M67.432 GANGLION CYST OF DORSUM OF LEFT WRIST: ICD-10-CM

## 2022-11-22 DIAGNOSIS — M79.642 LEFT HAND PAIN: Primary | ICD-10-CM

## 2022-11-22 DIAGNOSIS — M77.8 LEFT WRIST TENDINITIS: ICD-10-CM

## 2022-11-22 PROCEDURE — 99203 OFFICE O/P NEW LOW 30 MIN: CPT | Performed by: ORTHOPAEDIC SURGERY

## 2022-11-22 RX ORDER — METHYLPREDNISOLONE 4 MG/1
TABLET ORAL
Qty: 1 DOSE PACK | Refills: 0 | Status: SHIPPED | OUTPATIENT
Start: 2022-11-22

## 2022-11-22 NOTE — PATIENT INSTRUCTIONS
Ganglions: Care Instructions  Your Care Instructions     A ganglion is a small sac, or cyst, filled with a clear fluid that is like jelly. A ganglion may look like a bump on the hand or wrist. It also can appear on your feet, ankles, knees, or shoulders. It is not cancer. A ganglion can grow out of the protective area, or capsule, around a joint. It also can grow on a tendon sheath, which covers the ropelike tendons that connect muscle to bone. A ganglion may hurt or cause numbness if it presses on a nerve. Many ganglions do not need treatment, and they often go away on their own. But if a ganglion hurts, becomes larger, causes numbness, or limits your activity, your doctor may want to drain it with a needle and syringe or remove it with minor surgery. Follow-up care is a key part of your treatment and safety. Be sure to make and go to all appointments, and call your doctor if you are having problems. It's also a good idea to know your test results and keep a list of the medicines you take. How can you care for yourself at home? Wear a wrist or finger splint as directed by your doctor. It will keep your wrist or hand from moving and help reduce the fluid in the cyst. This may be all you need for the ganglion to shrink and go away. Do not smash a ganglion with a book or other heavy object. You may break a bone or otherwise injure your wrist by trying this folk remedy, and the ganglion may return anyway. Do not try to drain the fluid by poking the ganglion with a pin or any other sharp object. You could cause an infection. When should you call for help? Call your doctor now or seek immediate medical care if:    You have signs of infection, such as: Increased pain, swelling, warmth, or redness. Red streaks leading from the cyst.  Pus draining from the cyst.  A fever. Watch closely for changes in your health, and be sure to contact your doctor if:    You have increasing pain.      Your ganglion is getting larger. You still have pain or numbness from a ganglion. Where can you learn more? Go to http://www.gray.com/  Enter [id number] in the search box to learn more about \"Ganglions: Care Instructions. \"  Current as of: March 9, 2022               Content Version: 13.4  © 0759-2378 [employer]. Care instructions adapted under license by [employer] (which disclaims liability or warranty for this information). If you have questions about a medical condition or this instruction, always ask your healthcare professional. [place] [address] any warranty or liability for your use of this information.

## 2022-11-22 NOTE — LETTER
11/22/2022    Patient: Maryanne Perez   YOB: 1970   Date of Visit: 11/22/2022     Ronny Thompson, [address] [facility] [address]  [place]    Dear Ronny Thompson DO,      Thank you for referring Ms. Maryanne Perez to [facility] for evaluation. My notes for this consultation are attached. If you have questions, please do not hesitate to call me. I look forward to following your patient along with you.       Sincerely,    Damion Cai MD

## 2022-11-22 NOTE — PROGRESS NOTES
HPI: Sulma Farah (: 1970) is a 50 y.o. female, patient, here for evaluation of the following chief complaint(s):    Wrist Pain (Left wrist dorsal bump. Initially occurred years ago and was small but this resolved. 6 months ago, it appeared again and has gradually gotten larger. Denies injury to area. Right hand dominant .)  Patient is seen today to evaluate her left wrist.  The patient has developed a left dorsal radial wrist ganglion cyst worsening since the beginning of the summer. She denies any fall or injury. She states that it was actually present a long time ago and was tiny and then seem to resolve but she noticed that at the late spring or early summer junction. It is slowly enlarged and states that it is sore to touch and bothers her in extension. When she flexes her wrist she localizes pain dorsally. She denies any numbness tingling or paresthesias in her hand or any digital locking. She has complained of some left-sided potential radicular pain and is scheduled to see a physician for her neck in December. Vitals:  Ht 5' (1.524 m)   Wt 130 lb (59 kg)   BMI 25.39 kg/m²    Body mass index is 25.39 kg/m². Allergies   Allergen Reactions    Latex Rash    Amoxicillin Nausea and Vomiting and Vertigo       Current Outpatient Medications   Medication Sig    calcium carbonate (CALCIUM 300 PO) Take  by mouth.    methylPREDNISolone (MEDROL DOSEPACK) 4 mg tablet Per dose pack instructions    olmesartan (BENICAR) 40 mg tablet Take 1 Tablet by mouth daily. cyanocobalamin (VITAMIN B12) 500 mcg tablet Take 500 mcg by mouth daily. amino acids (AMINO ACID PO) Take 500 mg by mouth. methocarbamoL (ROBAXIN) 500 mg tablet Take 1 Tablet by mouth nightly as needed for Muscle Spasm(s). No current facility-administered medications for this visit.        Past Medical History:   Diagnosis Date    Hypertension         Past Surgical History:   Procedure Laterality Date    HX BREAST AUGMENTATION Bilateral         HX  SECTION      HX  SECTION         History reviewed. No pertinent family history. Social History     Tobacco Use    Smoking status: Never    Smokeless tobacco: Never   Vaping Use    Vaping Use: Never used   Substance Use Topics    Alcohol use: Yes     Alcohol/week: 1.0 standard drink     Types: 1 Glasses of wine per week     Comment: occassional    Drug use: Never        Review of Systems   All other systems reviewed and are negative. Physical Exam    Overall the patient demonstrates good elbow, forearm, wrist and hand motion. She has a 1 to 1.5 cm fairly firm but mobile dorsal radial left wrist ganglion cyst.  This is noninfectious in appearance. She has discomfort when she hyperextends her wrist and applies a load such as pushing off. She denies numbness or tingling and had negative Tinel's Phalen's and nerve compression test and no digital locking. Imaging:    XR Results (most recent):  Results from Appointment encounter on 22    XR WRIST LT AP/LAT/OBL MIN 3V    Narrative  AP, lateral and oblique x-ray of the left wrist shows normal osseous and joint space findings no evidence of fracture, arthritis, calcification or acute abnormality. There is ulnar neutral to some very slight ulnar positive variance. ASSESSMENT/PLAN:  Below is the assessment and plan developed based on review of pertinent history, physical exam, labs, studies, and medications. Patient examination was consistent with left wrist pain tendinitis and mostly a dorsal radial wrist ganglion cyst.  This has been present since this spring summer junction of  although she feels it may have been present years ago but was much smaller. She was offered but deferred an aspiration and prefers instead to proceed with operative excision of the ganglion cyst.  She may still trial a Medrol Dosepak to help with some of the tendinitis and localized swelling.   I reviewed the procedure in detail and answered questions. I reviewed risks that include but not limited to stiffness, pain, nerve or tendon damage and cyst recurrence. Arrangements can be made for this to be performed on an outpatient basis soon as possible. 1. Left hand pain  -     XR WRIST LT AP/LAT/OBL MIN 3V; Future  -     methylPREDNISolone (MEDROL DOSEPACK) 4 mg tablet; Per dose pack instructions, Normal, Disp-1 Dose Pack, R-0  2. Ganglion cyst of dorsum of left wrist  3. Left wrist tendinitis      Return for Follow-up 7 to 10 days after surgery. .    An electronic signature was used to authenticate this note.   -- Danay Gillette MD

## 2022-11-23 ENCOUNTER — OFFICE VISIT (OUTPATIENT)
Dept: PRIMARY CARE CLINIC | Age: 52
End: 2022-11-23
Payer: MEDICAID

## 2022-11-23 VITALS
SYSTOLIC BLOOD PRESSURE: 110 MMHG | OXYGEN SATURATION: 97 % | BODY MASS INDEX: 25.52 KG/M2 | HEART RATE: 70 BPM | WEIGHT: 130 LBS | RESPIRATION RATE: 18 BRPM | HEIGHT: 60 IN | DIASTOLIC BLOOD PRESSURE: 72 MMHG | TEMPERATURE: 98.4 F

## 2022-11-23 DIAGNOSIS — I10 PRIMARY HYPERTENSION: Primary | ICD-10-CM

## 2022-11-23 DIAGNOSIS — Z11.52 ENCOUNTER FOR SCREENING FOR COVID-19: ICD-10-CM

## 2022-11-23 DIAGNOSIS — Z12.4 SCREENING FOR CERVICAL CANCER: ICD-10-CM

## 2022-11-23 LAB — SARS-COV-2 POC: NEGATIVE

## 2022-11-23 PROCEDURE — 87426 SARSCOV CORONAVIRUS AG IA: CPT | Performed by: FAMILY MEDICINE

## 2022-11-23 PROCEDURE — 3078F DIAST BP <80 MM HG: CPT | Performed by: FAMILY MEDICINE

## 2022-11-23 PROCEDURE — 3074F SYST BP LT 130 MM HG: CPT | Performed by: FAMILY MEDICINE

## 2022-11-23 PROCEDURE — 99212 OFFICE O/P EST SF 10 MIN: CPT | Performed by: FAMILY MEDICINE

## 2022-11-23 NOTE — PROGRESS NOTES
Chief Complaint   Patient presents with    Gyn Exam     Identified pt with two pt identifiers(name and ). Chief Complaint   Patient presents with    Gyn Exam        3 most recent PHQ Screens 2022   Little interest or pleasure in doing things Not at all   Feeling down, depressed, irritable, or hopeless Not at all   Total Score PHQ 2 0        Vitals:    22 1155 22 1158   BP: 109/65 101/67   Pulse: 70    Resp: 18    Temp: 98.4 °F (36.9 °C)    TempSrc: Temporal    SpO2: 97%    Weight: 130 lb (59 kg)    Height: 5' (1.524 m)    PainSc:   2   2   PainLoc: Hand Hand       Health Maintenance Due   Topic Date Due    DTaP/Tdap/Td series (1 - Tdap) Never done    Cervical cancer screen  Never done    Lipid Screen  Never done    Colorectal Cancer Screening Combo  Never done    COVID-19 Vaccine (3 - Booster for Pfizer series) 2021    Shingrix Vaccine Age 50> (1 of 2) Never done    Breast Cancer Screen Mammogram  Never done        1. Have you been to the ER, urgent care clinic since your last visit? Hospitalized since your last visit? No    2. Have you seen or consulted any other health care providers outside of the [address] [place] [address] since your last visit? Include any pap smears or colon screening. No    3. For patients aged 39-70: Has the patient had a colonoscopy / FIT/ Cologuard? No      If the patient is female:    4. For patients aged 41-77: Has the patient had a mammogram within the past 2 years? No      5. For patients aged 21-65: Has the patient had a pap smear? No    Chief Complaint   Patient presents with    Gyn Exam    Hypotension     Patient was concerned that her BP was lower than normal when taken today in the office.

## 2022-11-23 NOTE — PROGRESS NOTES
HPI     Chief Complaint   Patient presents with    Gyn Exam    Hypotension     Patient was concerned that her BP was lower than normal when taken today in the office. She is a 50 y.o. female who presents for GYN exam.     Does not want STD testing with PAP. States BP was a little lower today then normal. Does not feel dizzy. Review of Systems   Genitourinary:  Negative for vaginal bleeding and vaginal discharge. Neurological:  Negative for dizziness and light-headedness. Reviewed PmHx, RxHx, FmHx, SocHx, AllgHx and updated and dated in the chart. Physical Exam:  Visit Vitals  /72   Pulse 70   Temp 98.4 °F (36.9 °C) (Temporal)   Resp 18   Ht 5' (1.524 m)   Wt 130 lb (59 kg)   SpO2 97%   BMI 25.39 kg/m²     Physical Exam  Vitals and nursing note reviewed. Constitutional:       General: She is not in acute distress. Appearance: Normal appearance. She is not ill-appearing. Cardiovascular:      Rate and Rhythm: Normal rate and regular rhythm. Heart sounds: No murmur heard. Pulmonary:      Effort: Pulmonary effort is normal. No respiratory distress. Breath sounds: Normal breath sounds. Genitourinary:     Comments: Pelvic - Exam chaperoned by yCnthia Wild MA. External genitalia normal without rashes or lesions. Pink and moist vaginal mucosa. Scant white discharge. Cervix without lesions or abnormal discharge. Uterus non tender and normal size. No adnexal masses. Neurological:      General: No focal deficit present. Mental Status: She is alert. Psychiatric:         Mood and Affect: Mood normal.         Behavior: Behavior normal.          Assessment / Plan     Diagnoses and all orders for this visit:    1. Primary hypertension - BP okay on recheck. Continue to monitor at home. 2. Screening for cervical cancer  -     PAP IG, APTIMA HPV AND RFX 16/18,45 ([id number])     I have discussed the diagnosis with the patient and the intended plan as seen in the above orders.  The patient has received an after-visit summary and questions were answered concerning future plans. I have discussed medication side effects and warnings with the patient as well.     Lawence Rein, DO

## 2022-12-01 DIAGNOSIS — M67.432 GANGLION CYST OF DORSUM OF LEFT WRIST: Primary | ICD-10-CM

## 2022-12-13 DIAGNOSIS — M67.432 GANGLION CYST OF DORSUM OF LEFT WRIST: Primary | ICD-10-CM

## 2022-12-14 RX ORDER — HYDROCODONE BITARTRATE AND ACETAMINOPHEN 5; 325 MG/1; MG/1
1 TABLET ORAL
Qty: 15 TABLET | Refills: 0 | Status: SHIPPED | OUTPATIENT
Start: 2022-12-14 | End: 2022-12-19

## 2022-12-22 NOTE — PROGRESS NOTES
HPI: Maryanne Perez (: 1970) is a 50 y.o. female, patient, here for evaluation of the following chief complaint(s):    Surgical Follow-up (Left dorsal wrist ganglion cyst excision on 12/15/22)  Patient is seen today to evaluate her left wrist.  The patient has developed a left dorsal radial wrist ganglion cyst worsening since the beginning of the summer. She denies any fall or injury. She states that it was actually present a long time ago and was tiny and then seem to resolve but she noticed that at the late spring or early summer junction. It is slowly enlarged and states that it is sore to touch and bothers her in extension. When she flexes her wrist she localizes pain dorsally. She denies any numbness tingling or paresthesias in her hand or any digital locking. She has complained of some left-sided potential radicular pain and is scheduled to see a physician for her neck in December. She elected to undergo excision of the left dorsal wrist ganglion cyst on 12/15/2022. Vitals:  Ht 5' (1.524 m)   Wt 130 lb (59 kg)   BMI 25.39 kg/m²    Body mass index is 25.39 kg/m². Allergies   Allergen Reactions    Latex Rash    Amoxicillin Nausea and Vomiting and Vertigo       Current Outpatient Medications   Medication Sig    calcium carbonate (CALCIUM 300 PO) Take  by mouth.    methylPREDNISolone (MEDROL DOSEPACK) 4 mg tablet Per dose pack instructions    methocarbamoL (ROBAXIN) 500 mg tablet Take 1 Tablet by mouth nightly as needed for Muscle Spasm(s). (Patient not taking: Reported on 2022)    olmesartan (BENICAR) 40 mg tablet Take 1 Tablet by mouth daily. cyanocobalamin (VITAMIN B12) 500 mcg tablet Take 500 mcg by mouth daily. amino acids (AMINO ACID PO) Take 500 mg by mouth. No current facility-administered medications for this visit.        Past Medical History:   Diagnosis Date    Hypertension         Past Surgical History:   Procedure Laterality Date    HX BREAST AUGMENTATION Bilateral         HX  SECTION      HX  SECTION         History reviewed. No pertinent family history. Social History     Tobacco Use    Smoking status: Never    Smokeless tobacco: Never   Vaping Use    Vaping Use: Never used   Substance Use Topics    Alcohol use: Yes     Alcohol/week: 1.0 standard drink     Types: 1 Glasses of wine per week     Comment: occassional    Drug use: Never        Review of Systems   All other systems reviewed and are negative. Physical Exam    Overall the wrist wound is healing well there is really no redness drainage or sign of infection. She denies numbness or tingling and had negative Tinel's Phalen's and nerve compression test and no digital locking. Imaging:    XR Results (most recent):  Results from Appointment encounter on 22    XR WRIST LT AP/LAT/OBL MIN 3V    Narrative  AP, lateral and oblique x-ray of the left wrist shows normal osseous and joint space findings no evidence of fracture, arthritis, calcification or acute abnormality. There is ulnar neutral to some very slight ulnar positive variance. ASSESSMENT/PLAN:  Below is the assessment and plan developed based on review of pertinent history, physical exam, labs, studies, and medications. Patient examination was consistent with left wrist pain tendinitis and mostly a dorsal radial wrist ganglion cyst.  This has been present since this spring summer junction of  although she feels it may have been present years ago but was much smaller. She was offered but deferred an aspiration and prefers instead to proceed with operative excision of the ganglion cyst.  She may still trial a Medrol Dosepak to help with some of the tendinitis and localized swelling. She next underwent excision of a left dorsal wrist ganglion cyst on 12/15/2022. Sutures removed and Steri-Strips applied on 2022. Overall doing well no sign of recurrence.   Continue with wound care, gentle motion and strength and return in 3 to 4 weeks. 1. Ganglion cyst of dorsum of left wrist  2. Status post surgical removal of ganglion cyst  3. Left hand pain      Return in about 4 weeks (around 1/20/2023). An electronic signature was used to authenticate this note.   -- Daisy Soto MD

## 2022-12-23 ENCOUNTER — OFFICE VISIT (OUTPATIENT)
Dept: ORTHOPEDIC SURGERY | Age: 52
End: 2022-12-23
Payer: MEDICAID

## 2022-12-23 VITALS — HEIGHT: 60 IN | WEIGHT: 130 LBS | BODY MASS INDEX: 25.52 KG/M2

## 2022-12-23 DIAGNOSIS — M67.432 GANGLION CYST OF DORSUM OF LEFT WRIST: Primary | ICD-10-CM

## 2022-12-23 DIAGNOSIS — Z98.890 STATUS POST SURGICAL REMOVAL OF GANGLION CYST: ICD-10-CM

## 2022-12-23 DIAGNOSIS — M79.642 LEFT HAND PAIN: ICD-10-CM

## 2022-12-23 PROCEDURE — 99024 POSTOP FOLLOW-UP VISIT: CPT | Performed by: ORTHOPAEDIC SURGERY

## 2022-12-23 NOTE — LETTER
12/23/2022    Patient: Sebastien Garcia   YOB: 1970   Date of Visit: 12/23/2022     Brooke Hatch, [address] [address]  [facility] [address]    Dear Brooke Hatch DO,      Thank you for referring Ms. Sebastien Garcia to [facility] for evaluation. My notes for this consultation are attached. If you have questions, please do not hesitate to call me. I look forward to following your patient along with you.       Sincerely,    Jaye Woo MD

## 2022-12-23 NOTE — PATIENT INSTRUCTIONS
Hand Exercises      Tendon glides   In this exercise, the steps follow one another to a make a continuous movement. With your affected hand, point your fingers and thumb straight up. Your wrist should be relaxed, following the line of your fingers and thumb. Curl your fingers so that the top two joints in them are bent, and your fingers wrap down. Your fingertips should touch or be near the base of your fingers. Your fingers will look like a hook. Make a fist by bending your knuckles. Your thumb can gently rest against your index (pointing) finger. Unwind your fingers slightly so that your fingertips can touch the base of your palm. Your thumb can rest against your index finger. Move back to your starting position, with your fingers and thumb pointing up. Repeat the series of motions 8 to 12 times. Switch hands and repeat steps 1 through 6, even if only one hand is sore. Intrinsic flexion   Rest your affected hand on a table and bend the large joints where your fingers connect to your hand. Keep your thumb and the other joints in your fingers straight. Slowly straighten your fingers. Your wrist should be relaxed, following the line of your fingers and thumb. Move back to your starting position, with your hand bent. Repeat 8 to 12 times. Switch hands and repeat steps 1 through 4, even if only one hand is sore. Finger extension   Place your affected hand flat on a table. Lift and then lower one finger at a time off the table. Repeat 8 to 12 times. Switch hands and repeat steps 1 through 3, even if only one hand is sore. MP extension   Place your good hand on a table, palm up. Put your affected hand on top of your good hand with your fingers wrapped around the thumb of your good hand like you are making a fist.  Slowly uncurl the joints of your affected hand where your fingers connect to your hand so that only the top two joints of your fingers are bent. Your fingers will look like a hook.   Move back to your starting position, with your fingers wrapped around your good thumb. Repeat 8 to 12 times. Switch hands and repeat steps 1 through 4, even if only one hand is sore. Wrist: Exercises  Introduction  Here are some examples of exercises for you to try. The exercises may be suggested for a condition or for rehabilitation. Start each exercise slowly. Ease off the exercises if you start to have pain. You will be told when to start these exercises and which ones will work best for you. How to do the exercises  Prayer stretch  Start with your palms together in front of your chest just below your chin. Slowly lower your hands toward your waistline, keeping your hands close to your stomach and your palms together until you feel a mild to moderate stretch under your forearms. Hold for at least 15 to 30 seconds. Repeat 2 to 4 times. Wrist flexor stretch  Extend your arm in front of you with your palm up. Bend your wrist, pointing your hand toward the floor. With your other hand, gently bend your wrist farther until you feel a mild to moderate stretch in your forearm. Hold for at least 15 to 30 seconds. Repeat 2 to 4 times. Wrist extensor stretch  Repeat steps 1 through 4 of the stretch above, but begin with your extended hand palm down. Follow-up care is a key part of your treatment and safety. Be sure to make and go to all appointments, and call your doctor if you are having problems. It's also a good idea to know your test results and keep a list of the medicines you take. Current as of: March 9, 2022               Content Version: 13.4  © 2006-2022 [employer]. Care instructions adapted under license by [employer] (which disclaims liability or warranty for this information).  If you have questions about a medical condition or this instruction, always ask your healthcare professional. [place] [address] any warranty or liability for your use of this information.